# Patient Record
Sex: FEMALE | Race: OTHER | Employment: UNEMPLOYED | URBAN - METROPOLITAN AREA
[De-identification: names, ages, dates, MRNs, and addresses within clinical notes are randomized per-mention and may not be internally consistent; named-entity substitution may affect disease eponyms.]

---

## 2018-06-25 ENCOUNTER — HOSPITAL ENCOUNTER (EMERGENCY)
Age: 38
Discharge: HOME | End: 2018-06-26
Attending: EMERGENCY MEDICINE
Payer: SELF-PAY

## 2018-06-25 DIAGNOSIS — M54.50 ACUTE BILATERAL LOW BACK PAIN WITHOUT SCIATICA: Primary | ICD-10-CM

## 2018-06-25 LAB
BACTERIA URNS QL MICRO: 0 /HPF
CASTS URNS QL MICRO: NORMAL /LPF
EPI CELLS #/AREA URNS HPF: NORMAL /HPF
RBC #/AREA URNS HPF: NORMAL /HPF
WBC URNS QL MICRO: NORMAL /HPF

## 2018-06-25 PROCEDURE — 81003 URINALYSIS AUTO W/O SCOPE: CPT | Performed by: EMERGENCY MEDICINE

## 2018-06-25 PROCEDURE — 81015 MICROSCOPIC EXAM OF URINE: CPT | Performed by: EMERGENCY MEDICINE

## 2018-06-25 PROCEDURE — 99284 EMERGENCY DEPT VISIT MOD MDM: CPT | Performed by: EMERGENCY MEDICINE

## 2018-06-26 ENCOUNTER — APPOINTMENT (OUTPATIENT)
Dept: GENERAL RADIOLOGY | Age: 38
End: 2018-06-26
Attending: EMERGENCY MEDICINE
Payer: SELF-PAY

## 2018-06-26 VITALS
TEMPERATURE: 98.3 F | RESPIRATION RATE: 16 BRPM | DIASTOLIC BLOOD PRESSURE: 53 MMHG | SYSTOLIC BLOOD PRESSURE: 106 MMHG | HEIGHT: 64 IN | BODY MASS INDEX: 23.9 KG/M2 | WEIGHT: 140 LBS | OXYGEN SATURATION: 99 % | HEART RATE: 77 BPM

## 2018-06-26 PROCEDURE — 74011250637 HC RX REV CODE- 250/637: Performed by: EMERGENCY MEDICINE

## 2018-06-26 PROCEDURE — 72100 X-RAY EXAM L-S SPINE 2/3 VWS: CPT

## 2018-06-26 PROCEDURE — 74011636637 HC RX REV CODE- 636/637: Performed by: EMERGENCY MEDICINE

## 2018-06-26 RX ORDER — METHOCARBAMOL 500 MG/1
750 TABLET, FILM COATED ORAL
Status: COMPLETED | OUTPATIENT
Start: 2018-06-26 | End: 2018-06-26

## 2018-06-26 RX ORDER — METHOCARBAMOL 750 MG/1
750 TABLET, FILM COATED ORAL 4 TIMES DAILY
Qty: 30 TAB | Refills: 0 | Status: SHIPPED | OUTPATIENT
Start: 2018-06-26 | End: 2018-07-04

## 2018-06-26 RX ORDER — PREDNISONE 20 MG/1
40 TABLET ORAL
Status: COMPLETED | OUTPATIENT
Start: 2018-06-26 | End: 2018-06-26

## 2018-06-26 RX ORDER — PREDNISONE 20 MG/1
40 TABLET ORAL DAILY
Qty: 8 TAB | Refills: 0 | Status: SHIPPED | OUTPATIENT
Start: 2018-06-26 | End: 2018-06-30

## 2018-06-26 RX ADMIN — METHOCARBAMOL 750 MG: 500 TABLET ORAL at 01:52

## 2018-06-26 RX ADMIN — PREDNISONE 40 MG: 20 TABLET ORAL at 01:49

## 2018-06-26 NOTE — ED NOTES
I have reviewed discharge instructions with the patient. The patient verbalized understanding. Patient left ED via Discharge Method: ambulatory to Home with family. Opportunity for questions and clarification provided. Patient given 2 scripts. Used interpretor for d/c. To continue your aftercare when you leave the hospital, you may receive an automated call from our care team to check in on how you are doing. This is a free service and part of our promise to provide the best care and service to meet your aftercare needs.  If you have questions, or wish to unsubscribe from this service please call [phone]. Thank you for Choosing our [facility] Emergency Department.

## 2018-06-26 NOTE — ED PROVIDER NOTES
HPI Comments: Patient states she has been having low back pain for the past 5 days. It was gradual in onset and has gotten progressively worse. She denies any trauma or obvious precipitating event. She describes a sharp lower back pain across her entire back worse with certain movements, better when she  Lies down or sits still. She has had some dysuria as well. She denies similar pain in the past, has not taken any medicine for her symptoms. The pain gets moderate in intensity at times. Elements of this note were created using speech recognition software. As such, errors of speech recognition may be present. Patient is a 42 y.o. female presenting with back pain. The history is provided by the patient. The history is limited by a language barrier. A  was used. Back Pain    Pertinent negatives include no fever. No past medical history on file. No past surgical history on file. No family history on file. Social History     Social History    Marital status: SINGLE     Spouse name: N/A    Number of children: N/A    Years of education: N/A     Occupational History    Not on file. Social History Main Topics    Smoking status: Never Smoker    Smokeless tobacco: Never Used    Alcohol use No    Drug use: No    Sexual activity: Not on file     Other Topics Concern    Not on file     Social History Narrative    No narrative on file         ALLERGIES: Review of patient's allergies indicates not on file. Review of Systems   Constitutional: Negative for chills and fever. Gastrointestinal: Negative for nausea and vomiting. Musculoskeletal: Positive for back pain. All other systems reviewed and are negative. Vitals:    06/25/18 2132   BP: 108/73   Pulse: 83   Resp: 18   Temp: 98.3 °F (36.8 °C)   SpO2: 98%   Weight: 63.5 kg (140 lb)   Height: 5' 4\" (1.626 m)            Physical Exam   Constitutional: She is oriented to person, place, and time.  She appears well-developed and well-nourished. HENT:   Head: Normocephalic and atraumatic. Eyes: Conjunctivae are normal. Pupils are equal, round, and reactive to light. Neck: Normal range of motion. Neck supple. Musculoskeletal: She exhibits tenderness. She exhibits no edema. Back:    Mild tenderness to palpation lower back as indicated   Neurological: She is alert and oriented to person, place, and time. Skin: Skin is warm and dry. Psychiatric: She has a normal mood and affect. Her behavior is normal.   Nursing note and vitals reviewed.        MDM  Number of Diagnoses or Management Options  Acute bilateral low back pain without sciatica: new and does not require workup  Diagnosis management comments: 1:36 AM discussed result of patient, need for follow-up with her primary doctor at [facility]       Amount and/or Complexity of Data Reviewed  Tests in the radiology section of CPT®: ordered and reviewed  Independent visualization of images, tracings, or specimens: yes    Risk of Complications, Morbidity, and/or Mortality  Presenting problems: moderate  Diagnostic procedures: moderate  Management options: moderate    Patient Progress  Patient progress: stable        ED Course       Procedures

## 2018-06-26 NOTE — ED TRIAGE NOTES
C\o left hip pain  pain that started 4 days ago and today being worse denies injury or heavy lifting or falling

## 2018-06-26 NOTE — DISCHARGE INSTRUCTIONS
Dolor de espalda: Instrucciones de cuidado - [ Back Pain: Care Instructions ]  Instrucciones de cuidado    El dolor de espalda tiene muchas causas posibles. Con frecuencia, está relacionado con problemas en los músculos y ligamentos de la espalda. También podría estar relacionado con problemas de los nervios, discos o huesos de la espalda. Moverse, levantar algo, ponerse de pie, sentarse o dormir de Babetta Jackelyn incómoda pueden forzar la espalda. Algunas veces no se da cuenta de que tiene gamaliel lesión [employer] tarde. La artritis es otra causa común del dolor de espalda. Aunque carlita vez duela mucho, el dolor de espalda por lo general mejora por sí mismo en varias semanas. [address] personas se recuperan en 12 semanas o menos. Hacer un buen tratamiento en el hogar y tener cuidado de no forzar la espalda puede ayudar a que se sienta mejor más pronto. La atención de seguimiento es gamaliel parte clave de berkowitz tratamiento y seguridad. Asegúrese de hacer y acudir a todas las citas, y llame a berkowitz médico si está teniendo problemas. También es gamaliel buena idea saber los resultados de los exámenes y mantener gamaliel lista de los medicamentos que amrita. ¿Cómo puede cuidarse en el hogar? · Siéntese o acuéstese en las posiciones más cómodas y que reduzcan el dolor. Trate gamaliel de estas posiciones al acostarse:  ¨ Acuéstese boca arriba con las rodillas dobladas y apoyadas sobre almohadones grandes. ¨ Acuéstese en el piso con las piernas sobre el asiento de un sofá o gamaliel silla. ¨ Acuéstese de lado con las rodillas y caderas dobladas y [place] entre las piernas. ¨ Acuéstese boca abajo siempre que esta posición no empeore el dolor. · No se siente en la cama y evite los sofás blandos y las posiciones torcidas. El reposo en cama puede aliviar el dolor al principio, stoney retrasa la sanación. Evite reposar en la cama después del primer día de dolor de espalda. · Cambie de posición cada 30 minutos.  Si debe sentarse por [employer], párese y descanse de estar sentado. Levántese y camine, o acuéstese en gamaliel posición cómoda. · Pruebe usar gamaliel almohadilla térmica a temperatura baja o mediana mel 15 a 20 minutos cada 2 ó 3 horas. Pruebe gamaliel ducha tibia en vez de gamaliel sesión con la almohadilla térmica. · También puede probar gamaliel compresa de hielo mle 10 a 15 minutos cada 2 a 3 horas. Póngase un paño adams entre la compresa de hielo y la piel. · [employer] analgésicos (medicamentos para el dolor) exactamente según las indicaciones. ¨ Si el médico le recetó un analgésico, tómelo según las indicaciones. ¨ Si no está tomando un analgésico recetado, pregúntele a berkowitz médico si puede savi vern de [place]. · Zoran caminatas cortas varias veces al día. Usted puede comenzar con 5 a 10 minutos, 3 ó 4 veces al día, y aumentar progresivamente hasta lograr caminatas más largas. Camine en superficies pool y evite colinas y escaleras hasta que la espalda esté mejor. · Regrese al Fadumo Ion y otras actividades lo más pronto posible. El descanso continuo sin actividad por lo general no es yeager para la espalda. · Para prevenir el dolor de espalda en el futuro, zoran ejercicios para estirar y fortalecer la espalda y el abdomen. Aprenda a [place], técnicas seguras para levantar peso y la mecánica corporal apropiada. ¿Cuándo debe pedir ayuda? Llame a berkowitz médico ahora mismo o busque atención médica inmediata si:  ? · Tiene un nuevo entumecimiento en [place]. ? · Tiene un nuevo debilitamiento en [facility]. ([place] puede hacer que sea difícil ponerse de pie). ? · Pierde el control de la vejiga o los intestinos. ?Preste especial atención a los cambios en berkowitz og y asegúrese de comunicarse con berkowitz médico si:  ? · El helga MORIN. ? · No está mejorando después de 2 semanas. ¿Dónde puede encontrar más información en inglés? Narciso Hardwick a http://abdoul-shaunna.info/.   Escriba B426 en la búsqueda para aprender más acerca de \"Dolor de espalda: Instrucciones de cuidado - [ Back Pain: Care Instructions ]. \"  Revisado: 21 marzo, 2017  Versión del contenido: 11.4  © 4222-8740 Healthwise, Incorporated. Las instrucciones de cuidado fueron adaptadas bajo licencia por [employer]ctions (which disclaims liability or warranty for this information). Si usted tiene [place] afección médica o sobre estas instrucciones, siempre pregunte a berkowitz profesional de og. Healthwise, Incorporated niega toda garantía o responsabilidad por berkowitz uso de esta información.

## 2020-05-07 ENCOUNTER — APPOINTMENT (OUTPATIENT)
Dept: GENERAL RADIOLOGY | Age: 40
End: 2020-05-07
Attending: EMERGENCY MEDICINE
Payer: SELF-PAY

## 2020-05-07 ENCOUNTER — HOSPITAL ENCOUNTER (EMERGENCY)
Age: 40
Discharge: HOME | End: 2020-05-07
Attending: EMERGENCY MEDICINE
Payer: SELF-PAY

## 2020-05-07 VITALS
SYSTOLIC BLOOD PRESSURE: 134 MMHG | HEART RATE: 100 BPM | OXYGEN SATURATION: 92 % | DIASTOLIC BLOOD PRESSURE: 72 MMHG | RESPIRATION RATE: 18 BRPM | TEMPERATURE: 100.3 F

## 2020-05-07 DIAGNOSIS — J18.9 ATYPICAL PNEUMONIA: Primary | ICD-10-CM

## 2020-05-07 PROCEDURE — 99283 EMERGENCY DEPT VISIT LOW MDM: CPT

## 2020-05-07 PROCEDURE — 71045 X-RAY EXAM CHEST 1 VIEW: CPT

## 2020-05-07 RX ORDER — BROMPHENIRAMINE MALEATE, PSEUDOEPHEDRINE HYDROCHLORIDE, AND DEXTROMETHORPHAN HYDROBROMIDE 2; 30; 10 MG/5ML; MG/5ML; MG/5ML
5 SYRUP ORAL
Qty: 200 ML | Refills: 0 | Status: SHIPPED | OUTPATIENT
Start: 2020-05-07 | End: 2020-09-30

## 2020-05-07 RX ORDER — DOXYCYCLINE HYCLATE 100 MG
100 TABLET ORAL 2 TIMES DAILY
Qty: 14 TAB | Refills: 0 | Status: SHIPPED | OUTPATIENT
Start: 2020-05-07 | End: 2020-05-14

## 2020-05-07 NOTE — ED NOTES
I have reviewed discharge instructions with the patient. The patient verbalized understanding. Patient left ED via Discharge Method: ambulatory to Home with self. Opportunity for questions and clarification provided. Patient given 2 scripts. No e-sign.  on phone with discharge. To continue your aftercare when you leave the hospital, you may receive an automated call from our care team to check in on how you are doing. This is a free service and part of our promise to provide the best care and service to meet your aftercare needs.  If you have questions, or wish to unsubscribe from this service please call [phone]. Thank you for Choosing our [facility] Emergency Department.

## 2020-05-07 NOTE — DISCHARGE INSTRUCTIONS
Patient Education        Neumonía: Instrucciones de cuidado  Pneumonia: Care Instructions  Instrucciones de cuidado    La neumonía es gamaliel infección de los pulmones. Eulis Yvon de los casos son causados por infecciones debidas a bacterias o virus. La neumonía puede ser leve o muy grave. Si es causada por bacterias, será tratado con antibióticos. La recuperación completa de la neumonía podría savi [employer] o algunos meses, dependiendo de qué tan enfermo estuvo y si berkowitz estado general de og es yeager. La atención de seguimiento es gamaliel parte clave de berkowitz tratamiento y seguridad. Asegúrese de hacer y acudir a todas las citas, y llame a berkowitz médico si está teniendo problemas. También es gamaliel buena idea saber los resultados de vish exámenes y mantener gamaliel lista de los medicamentos que amrita. ¿Cómo puede cuidarse en el hogar? · [address] indicaciones. No deje de tomarlos por el hecho de sentirse mejor. Debe savi todos los antibióticos hasta terminarlos. · [employer] medicamentos exactamente francy le fueron recetados. Llame a berkowitz médico si yang estar teniendo problemas con berkowitz medicamento. · Descanse y duerma lo suficiente. Podría sentirse cansado y débil mel un [place], kike berkowitz nivel de energía mejorará con [place]. · Para prevenir la deshidratación, milka abundantes líquidos, los suficientes para que berkowitz orina sea de color amarillo jose o helene francy el agua. Elija agua y otros líquidos mc sin cafeína hasta que se sienta mejor. Si tiene gamaliel enfermedad del riñón, del corazón o del hígado y tiene que [place] líquidos, hable con berkowitz médico antes de aumentar berkowitz consumo. · Trate la tos para que pueda descansar. La tos con mucosidad (flema) de los pulmones es común con la neumonía. Es gamaliel de las [employer] que berkowitz organismo [address].  Kike si la tos le impide descansar o le causa gran fatiga y dolor en la pared torácica, hable con berkowitz médico. Podría sugerirle que tome un medicamento para aliviar la tos. · Utilice un vaporizador o humidificador para añadir humedad en berkowitz habitación. Siga las indicaciones para limpiar el aparato. · No fume ni permita que otras personas fumen cerca de usted. Fumar hará que la tos dure [place]. Si necesita ayuda para dejar de fumar, hable con berkowitz médico [employer] y medicamentos para dejar de fumar. Éstos pueden aumentar vish probabilidades de dejar el hábito para siempre. · [place] un analgésico (medicamento para el dolor) de venta venkata, francy acetaminofén (Tylenol), ibuprofeno (Advil, Motrin) o naproxeno (Aleve). Ghislaine y siga todas las instrucciones de la [place]. · No tome dos o más analgésicos al mismo tiempo a menos que el médico se lo haya indicado. Muchos analgésicos contienen acetaminofén, es decir, Tylenol. El exceso de acetaminofén (Tylenol) puede ser dañino. · Si le dieron un espirómetro para medir qué tan delfino están funcionando vish pulmones, utilícelo francy se lo indicaron. [place] puede ayudar a berkowitz médico a saber cómo va berkowitz recuperación. · Para prevenir la neumonía en el futuro, hable con berkowitz médico acerca de vacunarse contra la gripe (gamaliel vez al año) y [place] antineumocócica (sólo gamaliel vez para la [address]). ¿Cuándo debe pedir ayuda? Llame al 911 en cualquier momento que considere que necesita atención de emergencia.  Por ejemplo, llame si:    · Tiene serias dificultades para respirar.    Llame a berkowitz médico ahora mismo o busque atención médica inmediata si:    · Tose mucosidad (esputo) de color café oscuro o con naomi.     · Tiene nueva o peor dificultad para respirar.     · Siente mareos o aturdimiento, o que está a punto de desmayarse.    Preste especial atención a los cambios en berkowitz og y asegúrese de comunicarse con berkowitz médico si:    · Presenta fiebre o la fiebre es más lucas.     · Berkowitz tos es más profunda o más frecuente que antes.     · No está mejorando después de 2 días (48 horas).     · No mejora francy se esperaba. ¿Dónde puede encontrar más información en inglés? Vaya a http://abdoul-shaunna.info/  Marylou [id number] en la búsqueda para aprender más acerca de \"Neumonía: Instrucciones de cuidado. \"  Revisado: 9 junio, 2019Versión del contenido: 12.4  © 7455-1205 Healthwise, Incorporated. Las instrucciones de cuidado fueron adaptadas bajo licencia por Good [facility] Connections (which disclaims liability or warranty for this information). Si usted tiene [place] afección médica o sobre estas instrucciones, siempre pregunte a berkowitz profesional de og. [employer] niega toda garantía o responsabilidad por berkowitz uso de esta información.

## 2020-05-07 NOTE — LETTER
5/9/2020 [address] [address] Dear Ms. Vignesh Anderson, You were recently seen in the Emergency Department of [facility] and had blood work or x-rays performed. We would like to discuss these with you. Please call the Emergency Department at your earliest convenience. If you were seen at [facility], please dial [phone], and if you were seen at [facility], please call [phone] to speak with one of our providers. Sincerely, 
 
Spenser Campos MD 
Medical Director Emergency Services, [facility]  
[phone]/ [phone]

## 2020-05-07 NOTE — PROGRESS NOTES
Hospital  provided over-the-phone interpretation for ER triage.  is available for over-the-phone language services  for all requests. Please call [phone].      Carmen [facility] ([facility] [place]  Language Services Department   [address]  [phone] (phone)

## 2020-05-07 NOTE — ED TRIAGE NOTES
Patient ambulatory to room A with mask in place.  - Delvin Ruby- used for triage over the phone. Patient complains of fever, cough, that is productive of green sputum, body aches and fatigue. Patient denies being exposed to anyone who has known covid 19 or recent travel.

## 2020-05-07 NOTE — ED PROVIDER NOTES
Patient complains of fever, cough, that is productive of green sputum, body aches and fatigue. Patient denies being exposed to anyone who has known covid 19 or recent travel. Patient reports subjective chills and diaphoresis as well. The history is provided by the patient. The history is limited by a language barrier. A  was used. Cough   This is a new problem. The current episode started more than 2 days ago. The problem occurs constantly. The problem has not changed since onset. The cough is productive of sputum. Patient reports a subjective fever - was not measured. The fever has been present for 1 - 2 days. Associated symptoms include chest pain and chills. Pertinent negatives include no sweats, no weight loss, no eye redness, no ear congestion, no ear pain, no headaches, no rhinorrhea, no sore throat, no myalgias, no shortness of breath, no wheezing, no nausea, no vomiting and no confusion. She has tried nothing for the symptoms. The treatment provided no relief. She is not a smoker. History reviewed. No pertinent past medical history. History reviewed. No pertinent surgical history. History reviewed. No pertinent family history.     Social History     Socioeconomic History    Marital status: SINGLE     Spouse name: Not on file    Number of children: Not on file    Years of education: Not on file    Highest education level: Not on file   Occupational History    Not on file   Social Needs    Financial resource strain: Not on file    Food insecurity     Worry: Not on file     Inability: Not on file    Transportation needs     Medical: Not on file     Non-medical: Not on file   Tobacco Use    Smoking status: Never Smoker    Smokeless tobacco: Never Used   Substance and Sexual Activity    Alcohol use: No    Drug use: No    Sexual activity: Not on file   Lifestyle    Physical activity     Days per week: Not on file     Minutes per session: Not on file    Stress: Not on file   Relationships    Social connections     Talks on phone: Not on file     Gets together: Not on file     Attends Mormon service: Not on file     Active member of club or organization: Not on file     Attends meetings of clubs or organizations: Not on file     Relationship status: Not on file    Intimate partner violence     Fear of current or ex partner: Not on file     Emotionally abused: Not on file     Physically abused: Not on file     Forced sexual activity: Not on file   Other Topics Concern    Not on file   Social History Narrative    Not on file         ALLERGIES: Patient has no known allergies. Review of Systems   Constitutional: Positive for chills. Negative for weight loss. HENT: Negative for ear pain, rhinorrhea and sore throat. Eyes: Negative for redness. Respiratory: Positive for cough. Negative for shortness of breath and wheezing. Cardiovascular: Positive for chest pain. Gastrointestinal: Negative for nausea and vomiting. Musculoskeletal: Negative for myalgias. Neurological: Negative for headaches. Psychiatric/Behavioral: Negative for confusion. All other systems reviewed and are negative. Vitals:    05/07/20 0828   BP: 134/72   Pulse: 100   Resp: 16   Temp: 100.3 °F (37.9 °C)   SpO2: 94%            Physical Exam  Vitals signs and nursing note reviewed. Constitutional:       General: She is not in acute distress. Appearance: Normal appearance. She is normal weight. She is not ill-appearing, toxic-appearing or diaphoretic. HENT:      Head: Normocephalic and atraumatic. Nose: Congestion and rhinorrhea present. Mouth/Throat:      Mouth: Mucous membranes are moist.      Pharynx: Oropharynx is clear. No oropharyngeal exudate or posterior oropharyngeal erythema. Eyes:      Extraocular Movements: Extraocular movements intact. Conjunctiva/sclera: Conjunctivae normal.      Pupils: Pupils are equal, round, and reactive to light.    Neck: Musculoskeletal: Normal range of motion and neck supple. Cardiovascular:      Rate and Rhythm: Normal rate and regular rhythm. Pulses: Normal pulses. Heart sounds: Normal heart sounds. Pulmonary:      Effort: Pulmonary effort is normal.      Breath sounds: Normal breath sounds. Skin:     General: Skin is warm and dry. Capillary Refill: Capillary refill takes less than 2 seconds. Neurological:      General: No focal deficit present. Mental Status: She is alert and oriented to person, place, and time. Mental status is at baseline. Psychiatric:         Mood and Affect: Mood normal.         Behavior: Behavior normal.         Thought Content: Thought content normal.          MDM  Number of Diagnoses or Management Options  Atypical pneumonia:   Diagnosis management comments: I wore appropriate PPE throughout this patient's ED visit.  Deejay Mcnulty DO, 10:09 AM           Amount and/or Complexity of Data Reviewed  Clinical lab tests: ordered  Tests in the radiology section of CPT®: ordered and reviewed    Risk of Complications, Morbidity, and/or Mortality  Presenting problems: low  Diagnostic procedures: low  Management options: low    Patient Progress  Patient progress: stable         Procedures

## 2020-05-08 ENCOUNTER — PATIENT OUTREACH (OUTPATIENT)
Dept: CASE MANAGEMENT | Age: 40
End: 2020-05-08

## 2020-05-08 NOTE — PROGRESS NOTES
CTN attempt to outreach to patient for COVID-19 response call due to ED discharge 3/30/2020. Unable to reach patient or leave message due to mailbox not set up. Will continue to outreach per protocol.

## 2020-05-09 LAB — EMERGENT DISEASE PANEL, EDPR: DETECTED

## 2020-05-09 NOTE — PROGRESS NOTES
Attempted to call the patient again on 5/9/2020 at 7:25 AM with the assistance of  Karsten Glavan. However, the number continues to say that the voicemail is not set up. We will try to send a letter to this patient.

## 2020-05-11 ENCOUNTER — PATIENT OUTREACH (OUTPATIENT)
Dept: CASE MANAGEMENT | Age: 40
End: 2020-05-11

## 2020-05-11 NOTE — PROGRESS NOTES
CTN attempted 2nd patient outreach for COVID-19 response call. Unable to reach patient or leave message due to mailbox not being set up. Initial note on 5/8/2020 stated contacted patient after ED discharge on 3/30/2020. Date patient was discharged from ED was 5/7/2020. Initial outreach also attempted 5/8/2020. Due to not being able to reach patient after 2 attempts CTN will close episode at this time.

## 2020-08-15 ENCOUNTER — HOSPITAL ENCOUNTER (EMERGENCY)
Age: 40
Discharge: HOME | End: 2020-08-15
Attending: EMERGENCY MEDICINE

## 2020-08-15 ENCOUNTER — APPOINTMENT (OUTPATIENT)
Dept: ULTRASOUND IMAGING | Age: 40
End: 2020-08-15
Attending: EMERGENCY MEDICINE

## 2020-08-15 VITALS
DIASTOLIC BLOOD PRESSURE: 56 MMHG | BODY MASS INDEX: 23.9 KG/M2 | TEMPERATURE: 97.8 F | HEIGHT: 64 IN | OXYGEN SATURATION: 97 % | SYSTOLIC BLOOD PRESSURE: 119 MMHG | RESPIRATION RATE: 17 BRPM | WEIGHT: 140 LBS | HEART RATE: 91 BPM

## 2020-08-15 DIAGNOSIS — N85.8 UTERINE MASS: ICD-10-CM

## 2020-08-15 DIAGNOSIS — R10.2 PELVIC PAIN: Primary | ICD-10-CM

## 2020-08-15 LAB
SERVICE CMNT-IMP: NORMAL
WET PREP GENITAL: NORMAL
WET PREP GENITAL: NORMAL

## 2020-08-15 PROCEDURE — 87210 SMEAR WET MOUNT SALINE/INK: CPT

## 2020-08-15 PROCEDURE — 81025 URINE PREGNANCY TEST: CPT

## 2020-08-15 PROCEDURE — 81003 URINALYSIS AUTO W/O SCOPE: CPT

## 2020-08-15 PROCEDURE — 76856 US EXAM PELVIC COMPLETE: CPT

## 2020-08-15 PROCEDURE — 87491 CHLMYD TRACH DNA AMP PROBE: CPT

## 2020-08-15 PROCEDURE — 74011250637 HC RX REV CODE- 250/637: Performed by: EMERGENCY MEDICINE

## 2020-08-15 PROCEDURE — 99284 EMERGENCY DEPT VISIT MOD MDM: CPT

## 2020-08-15 RX ORDER — DICLOFENAC SODIUM 50 MG/1
50 TABLET, DELAYED RELEASE ORAL 2 TIMES DAILY
Qty: 14 TAB | Refills: 0 | Status: SHIPPED | OUTPATIENT
Start: 2020-08-15 | End: 2020-09-30

## 2020-08-15 RX ORDER — TRAMADOL HYDROCHLORIDE 50 MG/1
50 TABLET ORAL
Status: COMPLETED | OUTPATIENT
Start: 2020-08-15 | End: 2020-08-15

## 2020-08-15 RX ADMIN — TRAMADOL HYDROCHLORIDE 50 MG: 50 TABLET ORAL at 08:26

## 2020-08-15 NOTE — ED TRIAGE NOTES
Patient reports lower abdominal and back pain for 4 days. Reports increased urination with dysuria and vaginal itching and odor. Also reports right arm pain. MD to triage for assessment. Verbal orders obtained at this time.

## 2020-08-15 NOTE — ED PROVIDER NOTES
42-year-old lady presents with concerns about pain in her suprapubic area that radiates into her lower back for the last 4 days. With this pain she says that she feels like she needs to urinate every few minutes. She notes that when she does urinate it is mildly painful. She denies any fevers or chills and has had no blood in her urine or bowels. Additionally, she says that she has had some vaginal odor and may be a little bit of discharge. No other associated symptoms. Elements of this note were created using speech recognition software. As such, errors of speech recognition may be present. History reviewed. No pertinent past medical history. History reviewed. No pertinent surgical history. History reviewed. No pertinent family history.     Social History     Socioeconomic History    Marital status: SINGLE     Spouse name: Not on file    Number of children: Not on file    Years of education: Not on file    Highest education level: Not on file   Occupational History    Not on file   Social Needs    Financial resource strain: Not on file    Food insecurity     Worry: Not on file     Inability: Not on file    Transportation needs     Medical: Not on file     Non-medical: Not on file   Tobacco Use    Smoking status: Never Smoker    Smokeless tobacco: Never Used   Substance and Sexual Activity    Alcohol use: No    Drug use: No    Sexual activity: Not on file   Lifestyle    Physical activity     Days per week: Not on file     Minutes per session: Not on file    Stress: Not on file   Relationships    Social connections     Talks on phone: Not on file     Gets together: Not on file     Attends Rastafarian service: Not on file     Active member of club or organization: Not on file     Attends meetings of clubs or organizations: Not on file     Relationship status: Not on file    Intimate partner violence     Fear of current or ex partner: Not on file     Emotionally abused: Not on file     Physically abused: Not on file     Forced sexual activity: Not on file   Other Topics Concern    Not on file   Social History Narrative    Not on file         ALLERGIES: Patient has no known allergies. Review of Systems   Constitutional: Negative for chills and fever. Gastrointestinal: Positive for abdominal pain. Negative for nausea and vomiting. Genitourinary: Positive for dysuria, frequency and vaginal discharge. Negative for vaginal bleeding. Vitals:    08/15/20 0802   BP: 116/72   Pulse: 91   Resp: 17   Temp: 97.8 °F (36.6 °C)   SpO2: 97%   Weight: 63.5 kg (140 lb)   Height: 5' 4\" (1.626 m)            Physical Exam  Vitals signs and nursing note reviewed. Constitutional:       Appearance: Normal appearance. Abdominal:      General: Bowel sounds are normal.      Palpations: Abdomen is soft. Genitourinary:     Comments: Minimal amount of white vaginal discharge with left adnexal tenderness on bimanual exam  Neurological:      Mental Status: She is alert. MDM  Number of Diagnoses or Management Options  Diagnosis management comments: I will check her urine and pending those results do a pelvic exam.      ED Course as of Aug 15 1045   Sat Aug 15, 2020   1042 I discussed with her the results of her ultrasound. Her swabs were all negative. I do not think she needs empiric therapy for any vaginal infections at this time. I did discuss the importance of follow-up for further evaluation of this uterine mass.   She also requested the results of her COVID testing from May and I will write a letter saying those were positive.    [AC]      ED Course User Index  [AC] Bert Hardy MD       Procedures

## 2020-08-15 NOTE — DISCHARGE INSTRUCTIONS
As we discussed, it is very important for you to follow-up with your primary care doctor and your gynecologist at the [address] for reevaluation of your uterine mass. We were unable to determine exactly what it is today in the emergency department so your follow-up appointment is very important to further characterize what it is. Please return with any fevers, vomiting, increasing pain, worsening symptoms, or additional concerns.

## 2020-08-15 NOTE — LETTER
[facility] 
[address] [facility] 
[phone] Work/School Note Date: 8/15/2020 To Whom It May concern: 
 
Clive Wolff was seen and treated today in the emergency room by the following provider(s): 
Attending Provider: Brenda Jara MD. Clive Wolff tested positive on May 7, 2024 COVID-19. She is recovered from that illness at this time. Sincerely, Caleb Morley MD

## 2020-08-15 NOTE — ED NOTES
I have reviewed discharge instructions with the patient. The patient verbalized understanding. Patient left ED via Discharge Method: ambulatory to Home with spouse. Opportunity for questions and clarification provided. Patient given 1 scripts. To continue your aftercare when you leave the hospital, you may receive an automated call from our care team to check in on how you are doing. This is a free service and part of our promise to provide the best care and service to meet your aftercare needs.  If you have questions, or wish to unsubscribe from this service please call [phone]. Thank you for Choosing our [facility] Emergency Department.

## 2020-08-17 LAB — HCG UR QL: NEGATIVE

## 2020-08-20 LAB
C TRACH RRNA SPEC QL NAA+PROBE: NEGATIVE
N GONORRHOEA RRNA SPEC QL NAA+PROBE: NEGATIVE
SPECIMEN SOURCE: NORMAL

## 2020-09-21 ENCOUNTER — HOSPITAL ENCOUNTER (EMERGENCY)
Age: 40
Discharge: HOME | End: 2020-09-21
Attending: EMERGENCY MEDICINE

## 2020-09-21 VITALS
SYSTOLIC BLOOD PRESSURE: 105 MMHG | HEART RATE: 84 BPM | RESPIRATION RATE: 16 BRPM | WEIGHT: 150 LBS | BODY MASS INDEX: 25.61 KG/M2 | HEIGHT: 64 IN | TEMPERATURE: 99.5 F | DIASTOLIC BLOOD PRESSURE: 75 MMHG | OXYGEN SATURATION: 97 %

## 2020-09-21 DIAGNOSIS — N94.89 ENDOMETRIAL MASS: ICD-10-CM

## 2020-09-21 DIAGNOSIS — N30.00 ACUTE CYSTITIS WITHOUT HEMATURIA: Primary | ICD-10-CM

## 2020-09-21 LAB
BACTERIA URNS QL MICRO: ABNORMAL /HPF
CASTS URNS QL MICRO: ABNORMAL /LPF
CRYSTALS URNS QL MICRO: 0 /LPF
EPI CELLS #/AREA URNS HPF: ABNORMAL /HPF
HCG UR QL: NEGATIVE
MUCOUS THREADS URNS QL MICRO: ABNORMAL /LPF
RBC #/AREA URNS HPF: ABNORMAL /HPF
WBC URNS QL MICRO: ABNORMAL /HPF

## 2020-09-21 PROCEDURE — 81015 MICROSCOPIC EXAM OF URINE: CPT

## 2020-09-21 PROCEDURE — 96372 THER/PROPH/DIAG INJ SC/IM: CPT

## 2020-09-21 PROCEDURE — 99284 EMERGENCY DEPT VISIT MOD MDM: CPT

## 2020-09-21 PROCEDURE — 81025 URINE PREGNANCY TEST: CPT

## 2020-09-21 PROCEDURE — 74011250636 HC RX REV CODE- 250/636: Performed by: EMERGENCY MEDICINE

## 2020-09-21 RX ORDER — NITROFURANTOIN 25; 75 MG/1; MG/1
100 CAPSULE ORAL 2 TIMES DAILY
Qty: 14 CAP | Refills: 0 | Status: SHIPPED | OUTPATIENT
Start: 2020-09-21 | End: 2020-09-28

## 2020-09-21 RX ORDER — TRAMADOL HYDROCHLORIDE 50 MG/1
50 TABLET ORAL
Qty: 15 TAB | Refills: 0 | Status: SHIPPED | OUTPATIENT
Start: 2020-09-21 | End: 2020-09-26

## 2020-09-21 RX ORDER — KETOROLAC TROMETHAMINE 30 MG/ML
60 INJECTION, SOLUTION INTRAMUSCULAR; INTRAVENOUS
Status: COMPLETED | OUTPATIENT
Start: 2020-09-21 | End: 2020-09-21

## 2020-09-21 RX ADMIN — KETOROLAC TROMETHAMINE 60 MG: 30 INJECTION, SOLUTION INTRAMUSCULAR at 09:40

## 2020-09-21 NOTE — ED PROVIDER NOTES
Patient was seen August 15 by Dr. Ady Lucas. Via ultrasound noticed a possible endometrial mass. [facility] was referred [facility] and told to follow-up with OB/GYN. Her first appointment with [facility] is October 9. Has not seen OB/GYN yet. Continues to have pain in the lower abdomen. No vaginal bleeding or discharge. No dysuria hematuria. Mild Nausea. The history is provided by the patient. A  was used. Urinary Frequency    This is a new problem. The current episode started more than 1 week ago. The problem occurs intermittently. The problem has not changed since onset. The patient is experiencing no pain. There has been no fever. Associated symptoms include nausea, frequency and abdominal pain (lower abd pain). Pertinent negatives include no chills, no sweats, no vomiting, no discharge, no hematuria, no hesitancy, no urgency, no flank pain, no vaginal discharge and no back pain. She has tried NSAIDs for the symptoms. The treatment provided no relief. History reviewed. No pertinent past medical history. History reviewed. No pertinent surgical history. History reviewed. No pertinent family history.     Social History     Socioeconomic History    Marital status: Not on file     Spouse name: Not on file    Number of children: Not on file    Years of education: Not on file    Highest education level: Not on file   Occupational History    Not on file   Social Needs    Financial resource strain: Not on file    Food insecurity     Worry: Not on file     Inability: Not on file    Transportation needs     Medical: Not on file     Non-medical: Not on file   Tobacco Use    Smoking status: Never Smoker    Smokeless tobacco: Never Used   Substance and Sexual Activity    Alcohol use: Never     Frequency: Never    Drug use: Never    Sexual activity: Not on file   Lifestyle    Physical activity     Days per week: Not on file     Minutes per session: Not on file    Stress: Not on file   Relationships    Social connections     Talks on phone: Not on file     Gets together: Not on file     Attends Moravian service: Not on file     Active member of club or organization: Not on file     Attends meetings of clubs or organizations: Not on file     Relationship status: Not on file    Intimate partner violence     Fear of current or ex partner: Not on file     Emotionally abused: Not on file     Physically abused: Not on file     Forced sexual activity: Not on file   Other Topics Concern    Not on file   Social History Narrative    Not on file         ALLERGIES: Patient has no known allergies. Review of Systems   Constitutional: Negative for chills and fever. Respiratory: Negative for chest tightness, shortness of breath and wheezing. Cardiovascular: Negative for chest pain and leg swelling. Gastrointestinal: Positive for abdominal pain (lower abd pain) and nausea. Negative for diarrhea and vomiting. Genitourinary: Positive for frequency and pelvic pain. Negative for difficulty urinating, dysuria, flank pain, hematuria, hesitancy, urgency, vaginal bleeding and vaginal discharge. Musculoskeletal: Negative for back pain, gait problem, neck pain and neck stiffness. Skin: Negative for color change and rash. Neurological: Negative for weakness, numbness and headaches. Vitals:    09/21/20 0848 09/21/20 0853   BP: 108/79    Pulse: 86    Resp: 16    Temp: 99.5 °F (37.5 °C)    SpO2: 97%    Weight:  68 kg (150 lb)   Height:  5' 4\" (1.626 m)            Physical Exam  Constitutional:       Appearance: Normal appearance. She is well-developed. Cardiovascular:      Rate and Rhythm: Normal rate and regular rhythm. Pulmonary:      Effort: Pulmonary effort is normal.      Breath sounds: Normal breath sounds. Abdominal:      General: Bowel sounds are normal.      Palpations: Abdomen is soft. Tenderness:  There is abdominal tenderness (lower abd suprapubic and LLQ). There is no guarding or rebound. Musculoskeletal: Normal range of motion. General: No swelling. Skin:     General: Skin is warm and dry. Neurological:      General: No focal deficit present. Mental Status: She is alert and oriented to person, place, and time. MDM  Number of Diagnoses or Management Options  Diagnosis management comments: With endometrial mass on previous ultrasound. Will refer to OB/GYN for follow up. Will treat UTI,.         Amount and/or Complexity of Data Reviewed  Clinical lab tests: ordered and reviewed  Tests in the radiology section of CPT®: reviewed  Tests in the medicine section of CPT®: ordered and reviewed    Patient Progress  Patient progress: stable         Procedures        Results Include:    Recent Results (from the past 24 hour(s))   HCG URINE, QL. - POC    Collection Time: 09/21/20  9:45 AM   Result Value Ref Range    Pregnancy test,urine (POC) Negative NEG     URINE MICROSCOPIC    Collection Time: 09/21/20  9:47 AM   Result Value Ref Range    WBC 10-20 0 /hpf    RBC 10-20 0 /hpf    Epithelial cells 10-20 0 /hpf    Bacteria 4+ (H) 0 /hpf    Casts HYALINE 0 /lpf    Crystals, urine 0 0 /LPF    Mucus 1+ (H) 0 /lpf

## 2020-09-21 NOTE — PROGRESS NOTES
[employer] [facility] provided over-the-phone language services for triage and MD assessment with Dr. Mahendra Lucas.   Available for further requests at [phone]. Thank you for using Language Services.

## 2020-09-21 NOTE — ED TRIAGE NOTES
Javier Points used on phone. States was seen here one month ago and referred elsewhere. States she was told she has a cyst or a mass. States her PCP cant help her either. \"my pelvis and my hips are hurting and im going to bathroom every 2-3 mins\". Does not known the name of the doctor she saw. Saw someone at [facility]. Denies vaginal bleeding. LMP 8/25/20.

## 2020-09-21 NOTE — ED NOTES
I have reviewed discharge instructions with the patient. The patient verbalized understanding. Patient left ED via Discharge Method: ambulatory to Home with self  Opportunity for questions and clarification provided. Patient given 2 scripts. No e-sign        To continue your aftercare when you leave the hospital, you may receive an automated call from our care team to check in on how you are doing. This is a free service and part of our promise to provide the best care and service to meet your aftercare needs.  If you have questions, or wish to unsubscribe from this service please call [phone]. Thank you for Choosing our [facility] Emergency Department.

## 2020-09-21 NOTE — DISCHARGE INSTRUCTIONS
Patient Education        Maribell Fling en las mujeres: Instrucciones de cuidado  Urinary Tract Infection in Women: Care Instructions  Instrucciones de cuidado    Gamaliel infección urinaria (UTI, por vish siglas en inglés) es un término general que hace referencia a gamaliel infección que se produce en cualquier parte entre los riñones y la uretra (conducto por el cual se expulsa la orina). La mayoría de las UTI son infecciones de la vejiga. Con frecuencia, causan dolor o ardor al [place]. Las UTI son causadas por bacterias y pueden curarse con antibióticos. Asegúrese de completar el tratamiento para que la infección desaparezca. La atención de seguimiento es gamaliel parte clave de berkowitz tratamiento y seguridad. Asegúrese de hacer y acudir a todas las citas, y llame a berkowitz médico si está teniendo problemas. También es gamaliel buena idea saber los resultados de vish exámenes y mantener gamaliel lista de los medicamentos que amrita. ¿Cómo puede cuidarse en el hogar? · [address] No deje de tomarlos por el hecho de sentirse mejor. Debe savi todos los antibióticos hasta terminarlos. · James los próximos vern o [address], luzmaria mayor cantidad de [place] y otros líquidos. [place] puede ayudar a eliminar las bacterias que provocan la infección. (Si tiene gamaliel enfermedad de los riñones, el corazón o el hígado y tiene que Gt's líquidos, hable con berkowitz médico antes de aumentar berkowitz consumo). · Evite las bebidas gaseosas o con cafeína. Pueden irritar la vejiga. · Orine con frecuencia. Trate de vaciar la vejiga cada vez que orine. · Para aliviar el dolor, tome un baño caliente o colóquese gamaliel almohadilla térmica a baja temperatura sobre la parte baja del abdomen o la mariaelena genital. Nunca se duerma mientras [place] gamaliel almohadilla térmica. Para prevenir las infecciones urinarias  · Luzmaria abundante agua todos los días. [place] la ayuda a orinar con frecuencia, lo que elimina las bacterias de berkowitz organismo.  (Si tiene Shira Hancock enfermedad de los riñones, el corazón o el hígado y tiene que Gt's líquidos, hable con berkowitz médico antes de aumentar berkowitz consumo). · Orine cuando necesite hacerlo. · Orine inmediatamente después de miriam tenido [employer]. · Cámbiese las toallas sanitarias con frecuencia. · Evite el uso de lavados vaginales, los pippa de burbujas, los [place] de higiene femenina y otros productos para la higiene femenina que contengan desodorantes. · Después de ir al baño, límpiese de adelante hacia atrás. ¿Cuándo debes pedir ayuda? Llama a tu médico ahora mismo o busca atención médica inmediata si:    · Aparecen síntomas francy fiebre, escalofríos, náuseas o vómitos por primera vez, o empeoran.     · Te empieza a doler la espalda, christine debajo de la caja torácica. A esto se le llama dolor en el flanco.     · Aparece naomi o pus en la orina.     · Tienes problemas con los antibióticos. Presta especial atención a los cambios en tu og y asegúrate de comunicarte con tu médico si:    · No mejoras después de miriam tomado un antibiótico mel 2 días.     · Los síntomas desaparecen y Niya Troy. ¿Dónde puede encontrar más información en inglés? Saratha Ormond a [email or web address]-shaunna.info/  Marylou [id number] en la búsqueda para aprender más acerca de \"Infección urinaria en las mujeres: Instrucciones de cuidado. \"  Revisado: 29 junio, 2020               Versión del contenido: 12.6  © 2255-4551 [employer]. Las instrucciones de cuidado fueron adaptadas bajo licencia por [employer] (which disclaims liability or warranty for this information). Si usted tiene [place] afección médica o sobre estas instrucciones, siempre pregunte a berkowitz profesional de og. [facility] niega toda garantía o responsabilidad por berkowitz uso de esta información.